# Patient Record
Sex: FEMALE | Race: WHITE | NOT HISPANIC OR LATINO | Employment: FULL TIME | URBAN - METROPOLITAN AREA
[De-identification: names, ages, dates, MRNs, and addresses within clinical notes are randomized per-mention and may not be internally consistent; named-entity substitution may affect disease eponyms.]

---

## 2020-01-08 ENCOUNTER — TRANSCRIBE ORDERS (OUTPATIENT)
Dept: ADMINISTRATIVE | Facility: HOSPITAL | Age: 60
End: 2020-01-08

## 2020-01-08 DIAGNOSIS — I77.1 INNOMINATE ARTERY STENOSIS (HCC): Primary | ICD-10-CM

## 2020-01-24 ENCOUNTER — HOSPITAL ENCOUNTER (OUTPATIENT)
Dept: CT IMAGING | Facility: HOSPITAL | Age: 60
Discharge: HOME | End: 2020-01-24
Admitting: SURGERY

## 2020-01-24 DIAGNOSIS — I77.1 INNOMINATE ARTERY STENOSIS (HCC): ICD-10-CM

## 2020-01-24 LAB — CREAT BLDA-MCNC: 1 MG/DL (ref 0.6–1.3)

## 2020-01-24 PROCEDURE — 82565 ASSAY OF CREATININE: CPT

## 2020-01-24 PROCEDURE — 0 IOPAMIDOL PER 1 ML: Performed by: SURGERY

## 2020-01-24 PROCEDURE — 71275 CT ANGIOGRAPHY CHEST: CPT

## 2020-01-24 RX ADMIN — IOPAMIDOL 95 ML: 755 INJECTION, SOLUTION INTRAVENOUS at 13:25

## 2023-12-27 RX ORDER — ERGOCALCIFEROL 1.25 MG/1
50000 CAPSULE ORAL WEEKLY
COMMUNITY

## 2023-12-27 RX ORDER — LEVOTHYROXINE SODIUM 112 UG/1
112 TABLET ORAL DAILY
Status: ON HOLD | COMMUNITY
End: 2023-12-28 | Stop reason: SDUPTHER

## 2023-12-27 RX ORDER — UBIDECARENONE 100 MG
100 CAPSULE ORAL DAILY
COMMUNITY
End: 2023-12-28 | Stop reason: HOSPADM

## 2023-12-27 RX ORDER — MULTIPLE VITAMINS W/ MINERALS TAB 9MG-400MCG
1 TAB ORAL DAILY
COMMUNITY

## 2023-12-27 RX ORDER — NIFEDIPINE 30 MG/1
30 TABLET, FILM COATED, EXTENDED RELEASE ORAL DAILY
Status: ON HOLD | COMMUNITY
End: 2023-12-28 | Stop reason: SDUPTHER

## 2023-12-27 RX ORDER — ATORVASTATIN CALCIUM 40 MG/1
40 TABLET, FILM COATED ORAL DAILY
COMMUNITY

## 2023-12-27 RX ORDER — ASPIRIN 81 MG/1
81 TABLET ORAL DAILY
COMMUNITY

## 2023-12-28 ENCOUNTER — ANESTHESIA (OUTPATIENT)
Dept: PERIOP | Facility: HOSPITAL | Age: 63
End: 2023-12-28
Payer: COMMERCIAL

## 2023-12-28 ENCOUNTER — HOSPITAL ENCOUNTER (OUTPATIENT)
Facility: HOSPITAL | Age: 63
Setting detail: HOSPITAL OUTPATIENT SURGERY
Discharge: HOME | End: 2023-12-28
Attending: SURGERY | Admitting: SURGERY
Payer: COMMERCIAL

## 2023-12-28 ENCOUNTER — ANESTHESIA EVENT (OUTPATIENT)
Dept: PERIOP | Facility: HOSPITAL | Age: 63
End: 2023-12-28
Payer: COMMERCIAL

## 2023-12-28 ENCOUNTER — APPOINTMENT (OUTPATIENT)
Dept: GENERAL RADIOLOGY | Facility: HOSPITAL | Age: 63
End: 2023-12-28
Payer: COMMERCIAL

## 2023-12-28 VITALS
OXYGEN SATURATION: 100 % | HEIGHT: 64 IN | TEMPERATURE: 98.4 F | HEART RATE: 64 BPM | WEIGHT: 209 LBS | BODY MASS INDEX: 35.68 KG/M2 | SYSTOLIC BLOOD PRESSURE: 127 MMHG | RESPIRATION RATE: 16 BRPM | DIASTOLIC BLOOD PRESSURE: 54 MMHG

## 2023-12-28 DIAGNOSIS — I74.2 ACUTE OCCLUSION OF ARTERY OF UPPER EXTREMITY: Primary | ICD-10-CM

## 2023-12-28 LAB
ANION GAP SERPL CALCULATED.3IONS-SCNC: 14 MMOL/L (ref 5–15)
BUN SERPL-MCNC: 22 MG/DL (ref 8–23)
BUN/CREAT SERPL: 29.7 (ref 7–25)
CALCIUM SPEC-SCNC: 9.5 MG/DL (ref 8.6–10.5)
CHLORIDE SERPL-SCNC: 105 MMOL/L (ref 98–107)
CO2 SERPL-SCNC: 21 MMOL/L (ref 22–29)
CREAT SERPL-MCNC: 0.74 MG/DL (ref 0.57–1)
DEPRECATED RDW RBC AUTO: 47 FL (ref 37–54)
EGFRCR SERPLBLD CKD-EPI 2021: 91.6 ML/MIN/1.73
ERYTHROCYTE [DISTWIDTH] IN BLOOD BY AUTOMATED COUNT: 14.3 % (ref 12.3–15.4)
GLUCOSE SERPL-MCNC: 87 MG/DL (ref 65–99)
HCT VFR BLD AUTO: 38.4 % (ref 34–46.6)
HGB BLD-MCNC: 12.5 G/DL (ref 12–15.9)
MCH RBC QN AUTO: 29.9 PG (ref 26.6–33)
MCHC RBC AUTO-ENTMCNC: 32.6 G/DL (ref 31.5–35.7)
MCV RBC AUTO: 91.9 FL (ref 79–97)
PLATELET # BLD AUTO: 272 10*3/MM3 (ref 140–450)
PMV BLD AUTO: 12.1 FL (ref 6–12)
POTASSIUM SERPL-SCNC: 5 MMOL/L (ref 3.5–5.2)
QT INTERVAL: 415 MS
QTC INTERVAL: 412 MS
RBC # BLD AUTO: 4.18 10*6/MM3 (ref 3.77–5.28)
SODIUM SERPL-SCNC: 140 MMOL/L (ref 136–145)
WBC NRBC COR # BLD AUTO: 5.74 10*3/MM3 (ref 3.4–10.8)

## 2023-12-28 PROCEDURE — 25010000002 HYDRALAZINE PER 20 MG: Performed by: REGISTERED NURSE

## 2023-12-28 PROCEDURE — 25010000002 CEFAZOLIN PER 500 MG: Performed by: SURGERY

## 2023-12-28 PROCEDURE — 25010000002 CEFAZOLIN IN DEXTROSE 2-4 GM/100ML-% SOLUTION: Performed by: SURGERY

## 2023-12-28 PROCEDURE — 25810000003 LACTATED RINGERS PER 1000 ML: Performed by: ANESTHESIOLOGY

## 2023-12-28 PROCEDURE — C1894 INTRO/SHEATH, NON-LASER: HCPCS | Performed by: SURGERY

## 2023-12-28 PROCEDURE — C1887 CATHETER, GUIDING: HCPCS | Performed by: SURGERY

## 2023-12-28 PROCEDURE — C1769 GUIDE WIRE: HCPCS | Performed by: SURGERY

## 2023-12-28 PROCEDURE — 75710 ARTERY X-RAYS ARM/LEG: CPT

## 2023-12-28 PROCEDURE — 25010000002 DEXAMETHASONE SODIUM PHOSPHATE 20 MG/5ML SOLUTION: Performed by: REGISTERED NURSE

## 2023-12-28 PROCEDURE — 25010000002 PROPOFOL 200 MG/20ML EMULSION: Performed by: REGISTERED NURSE

## 2023-12-28 PROCEDURE — C1725 CATH, TRANSLUMIN NON-LASER: HCPCS | Performed by: SURGERY

## 2023-12-28 PROCEDURE — 25010000002 HEPARIN (PORCINE) PER 1000 UNITS: Performed by: REGISTERED NURSE

## 2023-12-28 PROCEDURE — 25010000002 BUPIVACAINE (PF) 0.25 % SOLUTION: Performed by: SURGERY

## 2023-12-28 PROCEDURE — 80048 BASIC METABOLIC PNL TOTAL CA: CPT | Performed by: SURGERY

## 2023-12-28 PROCEDURE — 25010000002 SUGAMMADEX 200 MG/2ML SOLUTION: Performed by: REGISTERED NURSE

## 2023-12-28 PROCEDURE — 25010000002 ONDANSETRON PER 1 MG: Performed by: REGISTERED NURSE

## 2023-12-28 PROCEDURE — 85027 COMPLETE CBC AUTOMATED: CPT | Performed by: SURGERY

## 2023-12-28 PROCEDURE — 25010000002 HEPARIN (PORCINE) PER 1000 UNITS: Performed by: SURGERY

## 2023-12-28 PROCEDURE — 25010000002 LABETALOL 5 MG/ML SOLUTION: Performed by: REGISTERED NURSE

## 2023-12-28 PROCEDURE — 25010000002 PROTAMINE SULFATE PER 10 MG: Performed by: REGISTERED NURSE

## 2023-12-28 PROCEDURE — 93005 ELECTROCARDIOGRAM TRACING: CPT | Performed by: SURGERY

## 2023-12-28 PROCEDURE — 25510000001 IOPAMIDOL PER 1 ML: Performed by: SURGERY

## 2023-12-28 DEVICE — LIGACLIP MCA MULTIPLE CLIP APPLIERS, 20 SMALL CLIPS
Type: IMPLANTABLE DEVICE | Site: ARM | Status: FUNCTIONAL
Brand: LIGACLIP

## 2023-12-28 RX ORDER — LABETALOL HYDROCHLORIDE 5 MG/ML
INJECTION, SOLUTION INTRAVENOUS AS NEEDED
Status: DISCONTINUED | OUTPATIENT
Start: 2023-12-28 | End: 2023-12-28 | Stop reason: SURG

## 2023-12-28 RX ORDER — PROMETHAZINE HYDROCHLORIDE 25 MG/1
25 SUPPOSITORY RECTAL ONCE AS NEEDED
Status: DISCONTINUED | OUTPATIENT
Start: 2023-12-28 | End: 2023-12-28 | Stop reason: HOSPADM

## 2023-12-28 RX ORDER — FAMOTIDINE 10 MG/ML
20 INJECTION, SOLUTION INTRAVENOUS ONCE
Status: COMPLETED | OUTPATIENT
Start: 2023-12-28 | End: 2023-12-28

## 2023-12-28 RX ORDER — OXYCODONE AND ACETAMINOPHEN 7.5; 325 MG/1; MG/1
1 TABLET ORAL EVERY 4 HOURS PRN
Status: DISCONTINUED | OUTPATIENT
Start: 2023-12-28 | End: 2023-12-28 | Stop reason: HOSPADM

## 2023-12-28 RX ORDER — FERROUS SULFATE 324(65)MG
324 TABLET, DELAYED RELEASE (ENTERIC COATED) ORAL
COMMUNITY
Start: 2023-12-10

## 2023-12-28 RX ORDER — PROTAMINE SULFATE 10 MG/ML
INJECTION, SOLUTION INTRAVENOUS AS NEEDED
Status: DISCONTINUED | OUTPATIENT
Start: 2023-12-28 | End: 2023-12-28 | Stop reason: SURG

## 2023-12-28 RX ORDER — SODIUM CHLORIDE 0.9 % (FLUSH) 0.9 %
3-10 SYRINGE (ML) INJECTION AS NEEDED
Status: DISCONTINUED | OUTPATIENT
Start: 2023-12-28 | End: 2023-12-28 | Stop reason: HOSPADM

## 2023-12-28 RX ORDER — NIFEDIPINE 60 MG/1
60 TABLET, EXTENDED RELEASE ORAL DAILY
COMMUNITY
Start: 2023-10-09

## 2023-12-28 RX ORDER — EPHEDRINE SULFATE 50 MG/ML
5 INJECTION, SOLUTION INTRAVENOUS ONCE AS NEEDED
Status: DISCONTINUED | OUTPATIENT
Start: 2023-12-28 | End: 2023-12-28 | Stop reason: HOSPADM

## 2023-12-28 RX ORDER — PROMETHAZINE HYDROCHLORIDE 25 MG/1
25 TABLET ORAL ONCE AS NEEDED
Status: DISCONTINUED | OUTPATIENT
Start: 2023-12-28 | End: 2023-12-28 | Stop reason: HOSPADM

## 2023-12-28 RX ORDER — DIPHENHYDRAMINE HYDROCHLORIDE 50 MG/ML
12.5 INJECTION INTRAMUSCULAR; INTRAVENOUS
Status: DISCONTINUED | OUTPATIENT
Start: 2023-12-28 | End: 2023-12-28 | Stop reason: HOSPADM

## 2023-12-28 RX ORDER — DROPERIDOL 2.5 MG/ML
0.62 INJECTION, SOLUTION INTRAMUSCULAR; INTRAVENOUS
Status: DISCONTINUED | OUTPATIENT
Start: 2023-12-28 | End: 2023-12-28 | Stop reason: HOSPADM

## 2023-12-28 RX ORDER — LORATADINE 10 MG/1
10 TABLET ORAL DAILY PRN
COMMUNITY

## 2023-12-28 RX ORDER — LEVOTHYROXINE SODIUM 0.15 MG/1
150 TABLET ORAL DAILY
COMMUNITY

## 2023-12-28 RX ORDER — ONDANSETRON 2 MG/ML
4 INJECTION INTRAMUSCULAR; INTRAVENOUS ONCE AS NEEDED
Status: DISCONTINUED | OUTPATIENT
Start: 2023-12-28 | End: 2023-12-28 | Stop reason: HOSPADM

## 2023-12-28 RX ORDER — HYDRALAZINE HYDROCHLORIDE 20 MG/ML
INJECTION INTRAMUSCULAR; INTRAVENOUS AS NEEDED
Status: DISCONTINUED | OUTPATIENT
Start: 2023-12-28 | End: 2023-12-28 | Stop reason: SURG

## 2023-12-28 RX ORDER — SODIUM CHLORIDE, SODIUM LACTATE, POTASSIUM CHLORIDE, CALCIUM CHLORIDE 600; 310; 30; 20 MG/100ML; MG/100ML; MG/100ML; MG/100ML
9 INJECTION, SOLUTION INTRAVENOUS CONTINUOUS
Status: DISCONTINUED | OUTPATIENT
Start: 2023-12-28 | End: 2023-12-28 | Stop reason: HOSPADM

## 2023-12-28 RX ORDER — CEFAZOLIN SODIUM 2 G/100ML
2000 INJECTION, SOLUTION INTRAVENOUS ONCE
Status: COMPLETED | OUTPATIENT
Start: 2023-12-28 | End: 2023-12-28

## 2023-12-28 RX ORDER — IPRATROPIUM BROMIDE AND ALBUTEROL SULFATE 2.5; .5 MG/3ML; MG/3ML
3 SOLUTION RESPIRATORY (INHALATION) ONCE AS NEEDED
Status: DISCONTINUED | OUTPATIENT
Start: 2023-12-28 | End: 2023-12-28 | Stop reason: HOSPADM

## 2023-12-28 RX ORDER — FLUMAZENIL 0.1 MG/ML
0.2 INJECTION INTRAVENOUS AS NEEDED
Status: DISCONTINUED | OUTPATIENT
Start: 2023-12-28 | End: 2023-12-28 | Stop reason: HOSPADM

## 2023-12-28 RX ORDER — DEXAMETHASONE SODIUM PHOSPHATE 4 MG/ML
INJECTION, SOLUTION INTRA-ARTICULAR; INTRALESIONAL; INTRAMUSCULAR; INTRAVENOUS; SOFT TISSUE AS NEEDED
Status: DISCONTINUED | OUTPATIENT
Start: 2023-12-28 | End: 2023-12-28 | Stop reason: SURG

## 2023-12-28 RX ORDER — PROPOFOL 10 MG/ML
INJECTION, EMULSION INTRAVENOUS AS NEEDED
Status: DISCONTINUED | OUTPATIENT
Start: 2023-12-28 | End: 2023-12-28 | Stop reason: SURG

## 2023-12-28 RX ORDER — BUPIVACAINE HYDROCHLORIDE 2.5 MG/ML
INJECTION, SOLUTION EPIDURAL; INFILTRATION; INTRACAUDAL AS NEEDED
Status: DISCONTINUED | OUTPATIENT
Start: 2023-12-28 | End: 2023-12-28 | Stop reason: HOSPADM

## 2023-12-28 RX ORDER — CLOPIDOGREL BISULFATE 75 MG/1
150 TABLET ORAL ONCE
Status: COMPLETED | OUTPATIENT
Start: 2023-12-28 | End: 2023-12-28

## 2023-12-28 RX ORDER — FENTANYL CITRATE 50 UG/ML
50 INJECTION, SOLUTION INTRAMUSCULAR; INTRAVENOUS
Status: DISCONTINUED | OUTPATIENT
Start: 2023-12-28 | End: 2023-12-28 | Stop reason: HOSPADM

## 2023-12-28 RX ORDER — SODIUM CHLORIDE 0.9 % (FLUSH) 0.9 %
3 SYRINGE (ML) INJECTION EVERY 12 HOURS SCHEDULED
Status: DISCONTINUED | OUTPATIENT
Start: 2023-12-28 | End: 2023-12-28 | Stop reason: HOSPADM

## 2023-12-28 RX ORDER — CLOPIDOGREL BISULFATE 75 MG/1
75 TABLET ORAL DAILY
Qty: 30 TABLET | Refills: 3 | Status: SHIPPED | OUTPATIENT
Start: 2023-12-28 | End: 2024-12-27

## 2023-12-28 RX ORDER — MIDAZOLAM HYDROCHLORIDE 1 MG/ML
1 INJECTION INTRAMUSCULAR; INTRAVENOUS
Status: DISCONTINUED | OUTPATIENT
Start: 2023-12-28 | End: 2023-12-28 | Stop reason: HOSPADM

## 2023-12-28 RX ORDER — HYDROMORPHONE HYDROCHLORIDE 1 MG/ML
0.5 INJECTION, SOLUTION INTRAMUSCULAR; INTRAVENOUS; SUBCUTANEOUS
Status: DISCONTINUED | OUTPATIENT
Start: 2023-12-28 | End: 2023-12-28 | Stop reason: HOSPADM

## 2023-12-28 RX ORDER — NALOXONE HCL 0.4 MG/ML
0.2 VIAL (ML) INJECTION AS NEEDED
Status: DISCONTINUED | OUTPATIENT
Start: 2023-12-28 | End: 2023-12-28 | Stop reason: HOSPADM

## 2023-12-28 RX ORDER — LABETALOL HYDROCHLORIDE 5 MG/ML
5 INJECTION, SOLUTION INTRAVENOUS
Status: DISCONTINUED | OUTPATIENT
Start: 2023-12-28 | End: 2023-12-28 | Stop reason: HOSPADM

## 2023-12-28 RX ORDER — LIDOCAINE HYDROCHLORIDE 10 MG/ML
0.5 INJECTION, SOLUTION INFILTRATION; PERINEURAL ONCE AS NEEDED
Status: DISCONTINUED | OUTPATIENT
Start: 2023-12-28 | End: 2023-12-28 | Stop reason: HOSPADM

## 2023-12-28 RX ORDER — ROCURONIUM BROMIDE 10 MG/ML
INJECTION, SOLUTION INTRAVENOUS AS NEEDED
Status: DISCONTINUED | OUTPATIENT
Start: 2023-12-28 | End: 2023-12-28 | Stop reason: SURG

## 2023-12-28 RX ORDER — FENTANYL CITRATE 50 UG/ML
50 INJECTION, SOLUTION INTRAMUSCULAR; INTRAVENOUS ONCE AS NEEDED
Status: DISCONTINUED | OUTPATIENT
Start: 2023-12-28 | End: 2023-12-28 | Stop reason: HOSPADM

## 2023-12-28 RX ORDER — HEPARIN SODIUM 1000 [USP'U]/ML
INJECTION, SOLUTION INTRAVENOUS; SUBCUTANEOUS AS NEEDED
Status: DISCONTINUED | OUTPATIENT
Start: 2023-12-28 | End: 2023-12-28 | Stop reason: SURG

## 2023-12-28 RX ORDER — LIDOCAINE HYDROCHLORIDE 20 MG/ML
INJECTION, SOLUTION INFILTRATION; PERINEURAL AS NEEDED
Status: DISCONTINUED | OUTPATIENT
Start: 2023-12-28 | End: 2023-12-28 | Stop reason: SURG

## 2023-12-28 RX ORDER — ONDANSETRON 2 MG/ML
INJECTION INTRAMUSCULAR; INTRAVENOUS AS NEEDED
Status: DISCONTINUED | OUTPATIENT
Start: 2023-12-28 | End: 2023-12-28 | Stop reason: SURG

## 2023-12-28 RX ORDER — HYDROCODONE BITARTRATE AND ACETAMINOPHEN 5; 325 MG/1; MG/1
1 TABLET ORAL ONCE AS NEEDED
Status: COMPLETED | OUTPATIENT
Start: 2023-12-28 | End: 2023-12-28

## 2023-12-28 RX ORDER — HYDRALAZINE HYDROCHLORIDE 20 MG/ML
5 INJECTION INTRAMUSCULAR; INTRAVENOUS
Status: DISCONTINUED | OUTPATIENT
Start: 2023-12-28 | End: 2023-12-28 | Stop reason: HOSPADM

## 2023-12-28 RX ORDER — TRAMADOL HYDROCHLORIDE 50 MG/1
50 TABLET ORAL EVERY 6 HOURS PRN
Qty: 20 TABLET | Refills: 0 | Status: SHIPPED | OUTPATIENT
Start: 2023-12-28 | End: 2024-12-27

## 2023-12-28 RX ADMIN — HEPARIN SODIUM 7500 UNITS: 1000 INJECTION, SOLUTION INTRAVENOUS; SUBCUTANEOUS at 09:55

## 2023-12-28 RX ADMIN — PROPOFOL 50 MG: 10 INJECTION, EMULSION INTRAVENOUS at 10:33

## 2023-12-28 RX ADMIN — LABETALOL HYDROCHLORIDE 10 MG: 5 INJECTION, SOLUTION INTRAVENOUS at 09:53

## 2023-12-28 RX ADMIN — HYDROCODONE BITARTRATE AND ACETAMINOPHEN 1 TABLET: 5; 325 TABLET ORAL at 11:27

## 2023-12-28 RX ADMIN — LIDOCAINE HYDROCHLORIDE 100 MG: 20 INJECTION, SOLUTION INFILTRATION; PERINEURAL at 09:38

## 2023-12-28 RX ADMIN — ONDANSETRON 4 MG: 2 INJECTION INTRAMUSCULAR; INTRAVENOUS at 09:44

## 2023-12-28 RX ADMIN — LABETALOL HYDROCHLORIDE 10 MG: 5 INJECTION, SOLUTION INTRAVENOUS at 10:07

## 2023-12-28 RX ADMIN — PROTAMINE SULFATE 30 MG: 10 INJECTION, SOLUTION INTRAVENOUS at 10:26

## 2023-12-28 RX ADMIN — SODIUM CHLORIDE, POTASSIUM CHLORIDE, SODIUM LACTATE AND CALCIUM CHLORIDE 9 ML/HR: 600; 310; 30; 20 INJECTION, SOLUTION INTRAVENOUS at 07:44

## 2023-12-28 RX ADMIN — IOPAMIDOL 100 ML: 510 INJECTION, SOLUTION INTRAVASCULAR at 10:00

## 2023-12-28 RX ADMIN — HYDRALAZINE HYDROCHLORIDE 10 MG: 20 INJECTION, SOLUTION INTRAMUSCULAR; INTRAVENOUS at 10:35

## 2023-12-28 RX ADMIN — CLOPIDOGREL BISULFATE 150 MG: 75 TABLET, FILM COATED ORAL at 11:42

## 2023-12-28 RX ADMIN — CEFAZOLIN SODIUM 2000 MG: 2 INJECTION, SOLUTION INTRAVENOUS at 09:22

## 2023-12-28 RX ADMIN — PROPOFOL 150 MG: 10 INJECTION, EMULSION INTRAVENOUS at 09:39

## 2023-12-28 RX ADMIN — Medication 3 ML: at 09:23

## 2023-12-28 RX ADMIN — DEXAMETHASONE SODIUM PHOSPHATE 10 MG: 4 INJECTION, SOLUTION INTRAMUSCULAR; INTRAVENOUS at 09:44

## 2023-12-28 RX ADMIN — SUGAMMADEX 200 MG: 100 INJECTION, SOLUTION INTRAVENOUS at 10:29

## 2023-12-28 RX ADMIN — FAMOTIDINE 20 MG: 10 INJECTION INTRAVENOUS at 07:43

## 2023-12-28 RX ADMIN — ROCURONIUM BROMIDE 50 MG: 10 INJECTION, SOLUTION INTRAVENOUS at 09:39

## 2023-12-28 NOTE — ANESTHESIA PROCEDURE NOTES
Airway  Urgency: elective    Date/Time: 12/28/2023 9:41 AM  Airway not difficult    General Information and Staff    Patient location during procedure: OR  Anesthesiologist: Mere Beasley MD  CRNA/CAA: Xavier Alexis CRNA    Indications and Patient Condition  Indications for airway management: airway protection    Preoxygenated: yes  MILS not maintained throughout  Mask difficulty assessment: 1 - vent by mask    Final Airway Details  Final airway type: endotracheal airway      Successful airway: ETT  Cuffed: yes   Successful intubation technique: direct laryngoscopy  Endotracheal tube insertion site: oral  Blade: Hermelindo  Blade size: 3  ETT size (mm): 7.0  Cormack-Lehane Classification: grade IIa - partial view of glottis  Placement verified by: chest auscultation and capnometry   Cuff volume (mL): 8  Measured from: teeth  ETT/EBT  to teeth (cm): 22  Number of attempts at approach: 1  Assessment: lips, teeth, and gum same as pre-op and atraumatic intubation

## 2023-12-28 NOTE — ANESTHESIA POSTPROCEDURE EVALUATION
"Patient: Curly Olmos    Procedure Summary       Date: 12/28/23 [facility] / Location:  [facility]    Anesthesia Start: 0929 Anesthesia Stop: 1052    Procedures:       RIGHT SUBCLAVIAN ARTERY PERCUTANEOUS TRANSLUMINAL ANGIOPLASTY, OPEN RIGHT BRACHIAL APPROACH (Right)      OPEN RIGHT BRACHIAL APPROACH (Right) Diagnosis:     Surgeons: Abbey Gooden Jr., MD Provider: Mere Beasley MD    Anesthesia Type: general ASA Status: 3            Anesthesia Type: general    Vitals  Vitals Value Taken Time   /52 12/28/23 1145   Temp 36.9 °C (98.4 °F) 12/28/23 1048   Pulse 60 12/28/23 1153   Resp     SpO2 100 % 12/28/23 1153   Vitals shown include unfiled device data.        Post Anesthesia Care and Evaluation    Patient location during evaluation: PHASE II  Patient participation: complete - patient participated  Level of consciousness: awake  Pain management: adequate    Airway patency: patent  Anesthetic complications: No anesthetic complications    Cardiovascular status: acceptable  Respiratory status: acceptable  Hydration status: acceptable    Comments: /54 (BP Location: Left arm, Patient Position: Lying)   Pulse 60   Temp 36.9 °C (98.4 °F)   Resp 16   Ht 162.6 cm (64\")   Wt 94.8 kg (209 lb)   SpO2 100%   BMI 35.87 kg/m²     "

## 2023-12-28 NOTE — DISCHARGE INSTRUCTIONS
[facility]  [address]ina, [address], [place] Tong,[place], Cat  [address]  [place] [id number] [phone]      Discharge Instructions for Angiogram    Go home, rest and take it easy today.      You may experience some dizziness or memory loss from the anesthesia.  This may last for the next 24 hours.  Someone should plan on staying with you for the first 24 hours for your safety.    Do not make any important legal decisions or sign any legal papers for the next 24 hours.      Eat and drink lightly today.  Start off with liquids, jello, soup, crackers or other bland foods at first. You may advance your diet tomorrow as tolerated as long as you do not experience any nausea or vomiting.    You may resume routine medications including blood thinners. However, Glucophage should be not be started for 72 hours after the dye is given.      No lifting over 10 pounds and no strenuous activity for the next 2-3 days.    Try not to strain or bear down when your bowels move or when you empty your bladder.    Limit going up and down steps for 2 days.    No driving for the remainder of the day.  You may resume limited driving tomorrow if necessary.      You may shower tomorrow.  No bath or hot tubs for at least 2 days after the procedure.          Leave the pressure dressing on until tomorrow morning.  You may then take this off, as well as the small see through dressing with gauze tomorrow.  If it doesn’t come off easily, do not pull this off.  If it is stuck, shower and let the warm water loosen it before removal.       Check your dressing regularly for bleeding through the dressing (under the pressure dressing).  A small amount of blood contained by the gauze is normal; the whole dressing should not be filled with blood or leaking out under the sides.     If you experience bleeding through the gauze/clear sticky dressing, lay flat and have someone apply direct pressure for 15 minutes.   If bleeding has stopped after this, you may put a clean gauze and tape over the area.  If bleeding continues after 15 minutes of pressure, call us at the number listed above.  There is an MD available after hours.      If you experience heavy bleeding or large swelling, continue to hold firm pressure and              call 911.  Do not call the MD on call.      If you experience pain or discomfort you may take Tylenol or Ibuprofen, whichever you normally use for minor discomfort, unless otherwise instructed.        If the MD gives you a prescription for narcotic pain pills (Tylenol #3, Vicodin, Hydrocodone, Oxycodone or Percocet), you cannot drive a vehicle or operate machinery while taking these.     Severe pain is not expected after this procedure.  If you experience severe pain, please call our office at [phone].     Remember to contact our office for any of the following:    Fever > 101 degrees  Severe pain that cannot be controlled by taking your pain pills  Severe nausea or vomiting   Significant bleeding of your incisions  Drainage that has a bad smell or is yellow or green in appearance  Any other questions or concerns

## 2023-12-28 NOTE — ANESTHESIA PREPROCEDURE EVALUATION
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        CODE STATUS:

## 2023-12-28 NOTE — OP NOTE
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    Procedure:   1) open right brachial artery access with right upper extremity angiogram; arch aortogram with balloon angioplasty of innominate artery using 7 mm x 40 mm balloon    Surgeon: Abbey Gooden Jr, MD    Assistant: None    Anesthesia: General    Staff:   Circulator: Caty Zamudio RN  Scrub Person: Arabella Boone  Vascular Radiology Technician: Marge Alvarez    Estimated Blood Loss: Minimal    Specimens: None  Order Name Source Comment Collection Info Order Time   CBC (NO DIFF)   Collected By: Kevin Pierre RN 12/28/2023  6:59 AM     Release to patient   Routine Release        BASIC METABOLIC PANEL   Collected By: Kevin Pierre RN 12/28/2023  6:59 AM     Release to patient   Routine Release            Complications: None apparent    Findings: Total occlusion of the innominate stent with normal-appearing subclavian artery and right common carotid artery, internal carotid artery, and external carotid arteries.  Normal right vertebral artery.    Indications:  As in preop diagnosis           Procedure: The patient was placed on the upper table in supine position.  After satisfactory general anesthesia was achieved the entire right upper extremity was prepped using ChloraPrep and draped in usual sterile fashion.  Local anesthetic was infiltrated in the skin and subcutaneous tissue in the right upper arm just above the flexor crease at the antecubital fossa.  A transverse incision was made and electrocautery was used to dissect through the subcutaneous tissue and fascia.  The brachial artery and vein complex was identified and the brachial artery isolated and Vesseloops passed  around this structure.  7500 units of heparin was given intravenously.  An access needle was placed into the brachial artery in a retrograde fashion and a guidewire advanced.  A micropuncture sheath was placed.  0.035 guidewire was then advanced to the proximal subclavian artery.  A 6 Congolese sheath was placed over the guidewire.  Hand-held injection of contrast was injected and there was good flow noted in the proximal brachial, axillary, and subclavian arteries which all appeared normal.  The vertebral artery was normal as well.  There was retrograde flow into the right common carotid artery and into the bifurcation as well as into the internal and external carotid arteries, all of which were normal.  Attempts were made to cross the occlusion of the innominate stent with a Berenstein catheter and guidewire but were unsuccessful.  Using an angled Perdomo cross catheter I was able to traverse the total occlusion using both the soft and and stiff ends of the guidewire.  The Perdomo cross catheter was then placed into the arch of the aorta and a hand-held injection confirmed this was intraluminal.  The guidewire was then directed toward the ascending aorta.  A pigtail catheter was placed over the guidewire and the image intensifier placed in the 20 degree Kiswahili view.  An arch aortogram was performed using 20 cc of contrast at 10 cc/s.  This demonstrated a normal arch with a totally occluded innominate artery with a normal left common carotid artery and left subclavian artery.  A 7 mm x 40 mm angioplasty balloon was placed over the guidewire to the area of occluded stent.  The head was turned steeply to the right.  The balloon was inflated to 20 jelani for 30 seconds.  It was then deflated and advanced slightly to make sure the origin of the innominate artery was also treated.  It was reinflated again to 20 jelani for 30 seconds.  Following this a pigtail catheter was placed over the guidewire to the ascending aorta.  An angiogram  was performed and demonstrated a patent innominate artery with good flow noted into the subclavian artery and right common carotid artery as well as internal and external carotid arteries.  Good opacification of a normal right vertebral artery.  Guidewires and catheters were removed.  The puncture site was treated with a 6-0 Prolene suture.  Hemostasis was achieved.  40 mg protamine sulfate was given intravenously.  Subcutaneous tissue was closed in 2 layers with running 3-0 Vicryl suture and skin closed running 4-0 Vicryl in subsequent fashion.  Dermabond was placed over the incision.  Sponge, needle, and instrument counts were all reported as correct.  Patient was extubated transported to the recovery room in satisfactory condition with a normal neurologic examination and a palpable radial pulse which is new.        Radiographic Findings:  1) as described above      There are no hospital problems to display for this patient.     Abbey Gooden Jr., MD     Date: 12/28/2023  Time: 10:50 EST

## 2024-12-05 NOTE — PROGRESS NOTES
"Chief Complaint  Subclavian Steal Syndrome  Follow-up on innominate artery stenosis    Subjective        Curly Olmos presents to [facility] VASCULAR SURGERY  History of Present Illness patient is a 64-year-old female who underwent open right carotid exploration with innominate artery angioplasty with stent in 2008.  She developed recurrent stenosis and recurrent arm claudication symptoms and underwent open right brachial artery approach with innominate artery angioplasty on December 28, 2023.  She has done well following with resolution of her symptoms.  She returned on December 11, 2024 with carotid duplex scan and bilateral brachial blood pressures.  Carotid duplex scan demonstrated less than 50% stenosis on the right and 50 to 69% stenosis on the left.  Retrograde flow noted in the right vertebral artery, antegrade on the left.  She no longer has arm claudication symptoms and no dizziness with the neck extended.    Past History:  Medical History: has a past medical history of Coronary artery disease, Disease of thyroid gland, Hyperlipidemia, Innominate artery stenosis, Macular degeneration, and Subclavian steal syndrome.   Surgical History: has a past surgical history that includes Subclavian stent placement; Cyst Removal; Peripheral arterial stent graft; laparoscopic tubal ligation; Coronary angioplasty with stent (2010); and Angioplasty Subclavian Artery (Right, 12/28/2023).   Family History: family history is not on file.   Social History: reports that she has quit smoking. Her smoking use included cigarettes. She has never used smokeless tobacco. She reports current alcohol use of about 3.0 standard drinks of alcohol per week. She reports that she does not use drugs.    (Not in a hospital admission)     Allergies: Patient has no known allergies.   Objective   Vital Signs:  /60   Ht 162.6 cm (64\")   Wt 96.3 kg (212 lb 4.8 oz)   BMI 36.44 kg/m²   Estimated body mass index is 36.44 " "kg/m² as calculated from the following:    Height as of this encounter: 162.6 cm (64\").    Weight as of this encounter: 96.3 kg (212 lb 4.8 oz).     Class 2 Severe Obesity (BMI >=35 and <=39.9). Obesity-related health conditions include the following: peripheral vascular disease. Obesity is improving with lifestyle modifications. BMI is is above average; BMI management plan is completed. We discussed portion control and increasing exercise.    Curly Olmos  reports that she has quit smoking. Her smoking use included cigarettes. She has never used smokeless tobacco.I         Physical Exam 2+ palpable brachial pulses noted bilaterally and 1-2+ palpable radial pulse on the right 2+ on the left.  Result Review :        Data reviewed : Carotid artery duplex scan from December 11, 2024, findings as above             Assessment and Plan     Diagnoses and all orders for this visit:    1. Atherosclerotic stenosis of innominate artery (Primary)  -     CT Angiogram Carotids; Future    2. Essential hypertension    3. Hypothyroidism, unspecified type    She actually doing very well, the only concerning aspect of her scan has to do with retrograde flow in the right vertebral artery.  She is not having any arm claudication symptoms and no vertebrobasilar symptoms.  At this point I do not believe anything further is needed other than continued observation.  I have recommended that we do a CT angiogram at her next visit to fully evaluate the innominate artery.  She is in agreement with the plan.  Will plan on doing this in about 8 months.  Risk factor discussed and no changes in medication regimen.  Her hypertension hyperlipidemia remain very stable with no recent changes.         Follow Up     Return in about 8 months (around 8/11/2025) for With CT angiogram of the carotids.  Patient was given instructions and counseling regarding her condition or for health maintenance advice. Please see specific information pulled into the AVS if " appropriate.

## 2024-12-11 ENCOUNTER — OFFICE VISIT (OUTPATIENT)
Age: 64
End: 2024-12-11
Payer: COMMERCIAL

## 2024-12-11 ENCOUNTER — HOSPITAL ENCOUNTER (OUTPATIENT)
Facility: HOSPITAL | Age: 64
Discharge: HOME | End: 2024-12-11
Admitting: SURGERY
Payer: COMMERCIAL

## 2024-12-11 VITALS
HEIGHT: 64 IN | BODY MASS INDEX: 36.25 KG/M2 | DIASTOLIC BLOOD PRESSURE: 60 MMHG | WEIGHT: 212.3 LBS | SYSTOLIC BLOOD PRESSURE: 122 MMHG

## 2024-12-11 DIAGNOSIS — E03.9 HYPOTHYROIDISM, UNSPECIFIED TYPE: ICD-10-CM

## 2024-12-11 DIAGNOSIS — I10 ESSENTIAL HYPERTENSION: ICD-10-CM

## 2024-12-11 DIAGNOSIS — I65.23 BILATERAL CAROTID ARTERY STENOSIS: ICD-10-CM

## 2024-12-11 DIAGNOSIS — I70.8 ATHEROSCLEROTIC STENOSIS OF INNOMINATE ARTERY: Primary | ICD-10-CM

## 2024-12-11 LAB
BH CV XLRA MEAS LEFT CAROTID BULB EDV: -19.3 CM/SEC
BH CV XLRA MEAS LEFT CAROTID BULB PSV: -98.2 CM/SEC
BH CV XLRA MEAS LEFT DIST CCA EDV: 18 CM/SEC
BH CV XLRA MEAS LEFT DIST CCA PSV: 113.7 CM/SEC
BH CV XLRA MEAS LEFT DIST ICA EDV: -31.8 CM/SEC
BH CV XLRA MEAS LEFT DIST ICA PSV: -154.8 CM/SEC
BH CV XLRA MEAS LEFT ICA/CCA RATIO: 1.59
BH CV XLRA MEAS LEFT MID CCA EDV: 18.2 CM/SEC
BH CV XLRA MEAS LEFT MID CCA PSV: 103.1 CM/SEC
BH CV XLRA MEAS LEFT MID ICA EDV: -36.2 CM/SEC
BH CV XLRA MEAS LEFT MID ICA PSV: -181.1 CM/SEC
BH CV XLRA MEAS LEFT PROX CCA EDV: 17.6 CM/SEC
BH CV XLRA MEAS LEFT PROX CCA PSV: 165.7 CM/SEC
BH CV XLRA MEAS LEFT PROX ECA EDV: -17.8 CM/SEC
BH CV XLRA MEAS LEFT PROX ECA PSV: -251.1 CM/SEC
BH CV XLRA MEAS LEFT PROX ICA EDV: 25.1 CM/SEC
BH CV XLRA MEAS LEFT PROX ICA PSV: 139.5 CM/SEC
BH CV XLRA MEAS LEFT PROX SCLA PSV: 461 CM/SEC
BH CV XLRA MEAS LEFT VERTEBRAL A EDV: 23.5 CM/SEC
BH CV XLRA MEAS LEFT VERTEBRAL A PSV: 108.2 CM/SEC
BH CV XLRA MEAS RIGHT CAROTID BULB EDV: -19.8 CM/SEC
BH CV XLRA MEAS RIGHT CAROTID BULB PSV: -56.2 CM/SEC
BH CV XLRA MEAS RIGHT DIST CCA EDV: -20.4 CM/SEC
BH CV XLRA MEAS RIGHT DIST CCA PSV: -49.8 CM/SEC
BH CV XLRA MEAS RIGHT DIST ICA EDV: -26.3 CM/SEC
BH CV XLRA MEAS RIGHT DIST ICA PSV: -67.6 CM/SEC
BH CV XLRA MEAS RIGHT ICA/CCA RATIO: 1.36
BH CV XLRA MEAS RIGHT MID CCA EDV: 22.4 CM/SEC
BH CV XLRA MEAS RIGHT MID CCA PSV: 62.3 CM/SEC
BH CV XLRA MEAS RIGHT MID ICA EDV: -31.2 CM/SEC
BH CV XLRA MEAS RIGHT MID ICA PSV: -67.2 CM/SEC
BH CV XLRA MEAS RIGHT PROX CCA EDV: -12.4 CM/SEC
BH CV XLRA MEAS RIGHT PROX CCA PSV: -82 CM/SEC
BH CV XLRA MEAS RIGHT PROX ECA EDV: -19.2 CM/SEC
BH CV XLRA MEAS RIGHT PROX ECA PSV: -95.5 CM/SEC
BH CV XLRA MEAS RIGHT PROX ICA EDV: -23.7 CM/SEC
BH CV XLRA MEAS RIGHT PROX ICA PSV: -58 CM/SEC
BH CV XLRA MEAS RIGHT PROX SCLA PSV: 188.2 CM/SEC
BH CV XLRA MEAS RIGHT VERTEBRAL A PSV: 101.9 CM/SEC
LEFT ARM BP: NORMAL MMHG
RIGHT ARM BP: NORMAL MMHG

## 2024-12-11 PROCEDURE — 93880 EXTRACRANIAL BILAT STUDY: CPT

## 2024-12-11 RX ORDER — LISINOPRIL 20 MG/1
20 TABLET ORAL DAILY
COMMUNITY
Start: 2024-10-16

## 2025-08-13 ENCOUNTER — HOSPITAL ENCOUNTER (OUTPATIENT)
Dept: CT IMAGING | Facility: HOSPITAL | Age: 65
Discharge: HOME | End: 2025-08-13
Admitting: SURGERY
Payer: COMMERCIAL

## 2025-08-13 ENCOUNTER — OFFICE VISIT (OUTPATIENT)
Age: 65
End: 2025-08-13
Payer: COMMERCIAL

## 2025-08-13 VITALS
DIASTOLIC BLOOD PRESSURE: 66 MMHG | SYSTOLIC BLOOD PRESSURE: 104 MMHG | WEIGHT: 215.2 LBS | BODY MASS INDEX: 36.74 KG/M2 | HEIGHT: 64 IN | HEART RATE: 65 BPM

## 2025-08-13 DIAGNOSIS — I70.8 ATHEROSCLEROTIC STENOSIS OF INNOMINATE ARTERY: ICD-10-CM

## 2025-08-13 DIAGNOSIS — I10 ESSENTIAL HYPERTENSION: ICD-10-CM

## 2025-08-13 DIAGNOSIS — F17.210 CONTINUOUS DEPENDENCE ON CIGARETTE SMOKING: ICD-10-CM

## 2025-08-13 DIAGNOSIS — I70.8 ATHEROSCLEROTIC STENOSIS OF INNOMINATE ARTERY: Primary | ICD-10-CM

## 2025-08-13 PROCEDURE — 25510000001 IOPAMIDOL PER 1 ML: Performed by: SURGERY

## 2025-08-13 PROCEDURE — 70498 CT ANGIOGRAPHY NECK: CPT

## 2025-08-13 RX ORDER — IOPAMIDOL 755 MG/ML
100 INJECTION, SOLUTION INTRAVASCULAR
Status: COMPLETED | OUTPATIENT
Start: 2025-08-13 | End: 2025-08-13

## 2025-08-13 RX ADMIN — IOPAMIDOL 95 ML: 755 INJECTION, SOLUTION INTRAVENOUS at 09:10

## (undated) DEVICE — SPNG LAP 18X18IN LF STRL PK/5

## (undated) DEVICE — BG ISOL DRWSTR INVISISHIELD 20X20IN

## (undated) DEVICE — VESSEL LOOPS X-RAY DETECTABLE: Brand: DEROYAL

## (undated) DEVICE — SINGLE BASIN: Brand: CARDINAL HEALTH

## (undated) DEVICE — GLV SURG SENSICARE PI MIC PF SZ7.5 LF STRL

## (undated) DEVICE — COUNT NDL MAG XLG

## (undated) DEVICE — IRRIGATOR BULB ASEPTO 60CC STRL

## (undated) DEVICE — GAUZE,SPONGE,4"X4",16PLY,XRAY,STRL,LF: Brand: MEDLINE

## (undated) DEVICE — NAVICROSS SUPPORT CATHETER: Brand: NAVICROSS

## (undated) DEVICE — SYRINGE KIT,PACKAGED,,150FT,MK 7(ANGIO-ARTERION, 150ML SYR KIT W/QFT,MC)(60729385): Brand: MEDRAD® MARK 7 ARTERION DISPOSABLE SYRINGE 150 ML WITH QUICK FILL TUBE

## (undated) DEVICE — CATH GUIDE SOFTVU FLUSH HT PIG .038 5F 110CM

## (undated) DEVICE — CVR PROB 96IN LF STRL

## (undated) DEVICE — CONTAINER,SPECIMEN,OR STERILE,4OZ: Brand: MEDLINE

## (undated) DEVICE — SUT PROLN 6/0 BV1 D/A 30IN 8709H

## (undated) DEVICE — ST ACC MICROPUNCTURE STFF .018 ECHO/PLAT/TP 4F/10CM 21G/7CM

## (undated) DEVICE — RADIFOCUS GLIDEWIRE: Brand: GLIDEWIRE

## (undated) DEVICE — PENCL ES MEGADINE EZ/CLEAN BUTN W/HOLSTR 10FT

## (undated) DEVICE — SUT SILK 3/0 SH CR8 18IN C013D

## (undated) DEVICE — SUT SILK 4/0 TIES 18IN A183H

## (undated) DEVICE — PK ANGIO 40

## (undated) DEVICE — PATIENT RETURN ELECTRODE, SINGLE-USE, CONTACT QUALITY MONITORING, ADULT, WITH 9FT CORD, FOR PATIENTS WEIGING OVER 33LBS. (15KG): Brand: MEGADYNE

## (undated) DEVICE — SUT SILK 3/0 TIES 18IN A184H

## (undated) DEVICE — SOL NACL 0.9PCT 1000ML

## (undated) DEVICE — SCANLAN® SUTURE BOOT™ INSTRUMENT JAW COVERS - ORIGINAL YELLOW, STANDARD PKG (5 PAIR/CARTRIDGE, 1 CARTRIDGE/PKG): Brand: SCANLAN® SUTURE BOOT™ INSTRUMENT JAW COVERS

## (undated) DEVICE — CATH ANGIO BEACON TP/KMP 5F 65CM .038IN

## (undated) DEVICE — PINNACLE INTRODUCER SHEATH: Brand: PINNACLE

## (undated) DEVICE — LP VESL MAXI 2.5X1MM RED 2PK

## (undated) DEVICE — ADHS SKIN SURG TISS VISC PREMIERPRO EXOFIN HI/VISC FAST/DRY

## (undated) DEVICE — CATH GUIDE SOFTVU FLUSH HT PIG .035 5F 65CM

## (undated) DEVICE — COVER,MAYO STAND,STERILE: Brand: MEDLINE

## (undated) DEVICE — TUBING, SUCTION, 1/4" X 20', STRAIGHT: Brand: MEDLINE INDUSTRIES, INC.

## (undated) DEVICE — ANTIBACTERIAL UNDYED BRAIDED (POLYGLACTIN 910), SYNTHETIC ABSORBABLE SUTURE: Brand: COATED VICRYL

## (undated) DEVICE — INTENDED FOR TISSUE SEPARATION, AND OTHER PROCEDURES THAT REQUIRE A SHARP SURGICAL BLADE TO PUNCTURE OR CUT.: Brand: BARD-PARKER ® CARBON RIB-BACK BLADES

## (undated) DEVICE — PTA BALLOON DILATATION CATHETER: Brand: MUSTANG™

## (undated) DEVICE — INFLATION DEVICE: Brand: ENCORE™ 26

## (undated) DEVICE — SUT SILK 2/0 TIES 18IN A185H